# Patient Record
Sex: FEMALE | Race: BLACK OR AFRICAN AMERICAN | NOT HISPANIC OR LATINO | Employment: FULL TIME | URBAN - METROPOLITAN AREA
[De-identification: names, ages, dates, MRNs, and addresses within clinical notes are randomized per-mention and may not be internally consistent; named-entity substitution may affect disease eponyms.]

---

## 2017-09-12 PROBLEM — I10 ESSENTIAL HYPERTENSION, BENIGN: Status: ACTIVE | Noted: 2017-09-12

## 2018-12-16 PROBLEM — J45.21 MILD INTERMITTENT ASTHMA WITH ACUTE EXACERBATION: Status: ACTIVE | Noted: 2018-12-16

## 2018-12-16 PROBLEM — E88.810 INSULIN RESISTANCE SYNDROME: Status: ACTIVE | Noted: 2018-12-16

## 2018-12-16 PROBLEM — J01.00 ACUTE NON-RECURRENT MAXILLARY SINUSITIS: Status: ACTIVE | Noted: 2018-12-16

## 2018-12-16 PROBLEM — I11.9 HYPERTENSIVE LEFT VENTRICULAR HYPERTROPHY, WITHOUT HEART FAILURE: Status: ACTIVE | Noted: 2018-12-16

## 2019-03-18 PROBLEM — J01.00 ACUTE NON-RECURRENT MAXILLARY SINUSITIS: Status: RESOLVED | Noted: 2018-12-16 | Resolved: 2019-03-18

## 2019-07-19 PROBLEM — E78.5 HYPERLIPIDEMIA: Status: ACTIVE | Noted: 2019-07-19

## 2019-07-19 PROBLEM — I12.9 HYPERTENSIVE NEPHROPATHY: Status: ACTIVE | Noted: 2019-07-19

## 2019-12-17 PROBLEM — B07.0 PLANTAR WART OF RIGHT FOOT: Status: ACTIVE | Noted: 2019-12-17

## 2019-12-19 ENCOUNTER — OFFICE VISIT (OUTPATIENT)
Dept: PODIATRY | Facility: CLINIC | Age: 39
End: 2019-12-19
Payer: COMMERCIAL

## 2019-12-19 VITALS
WEIGHT: 220.88 LBS | HEIGHT: 65 IN | BODY MASS INDEX: 36.8 KG/M2 | HEART RATE: 97 BPM | DIASTOLIC BLOOD PRESSURE: 97 MMHG | SYSTOLIC BLOOD PRESSURE: 159 MMHG

## 2019-12-19 DIAGNOSIS — M79.675 PAIN OF TOE OF LEFT FOOT: ICD-10-CM

## 2019-12-19 DIAGNOSIS — B07.0 PLANTAR WARTS: Primary | ICD-10-CM

## 2019-12-19 PROCEDURE — 17000 PR DESTRUCTION(LASER SURGERY,CRYOSURGERY,CHEMOSURGERY),PREMALIGNANT LESIONS,FIRST LESION: ICD-10-PCS | Mod: TA,S$GLB,, | Performed by: PODIATRIST

## 2019-12-19 PROCEDURE — 3008F PR BODY MASS INDEX (BMI) DOCUMENTED: ICD-10-PCS | Mod: CPTII,S$GLB,, | Performed by: PODIATRIST

## 2019-12-19 PROCEDURE — 99204 PR OFFICE/OUTPT VISIT, NEW, LEVL IV, 45-59 MIN: ICD-10-PCS | Mod: 25,S$GLB,, | Performed by: PODIATRIST

## 2019-12-19 PROCEDURE — 3008F BODY MASS INDEX DOCD: CPT | Mod: CPTII,S$GLB,, | Performed by: PODIATRIST

## 2019-12-19 PROCEDURE — 3077F SYST BP >= 140 MM HG: CPT | Mod: CPTII,S$GLB,, | Performed by: PODIATRIST

## 2019-12-19 PROCEDURE — 99204 OFFICE O/P NEW MOD 45 MIN: CPT | Mod: 25,S$GLB,, | Performed by: PODIATRIST

## 2019-12-19 PROCEDURE — 99999 PR PBB SHADOW E&M-EST. PATIENT-LVL III: ICD-10-PCS | Mod: PBBFAC,,, | Performed by: PODIATRIST

## 2019-12-19 PROCEDURE — 17000 DESTRUCT PREMALG LESION: CPT | Mod: TA,S$GLB,, | Performed by: PODIATRIST

## 2019-12-19 PROCEDURE — 3077F PR MOST RECENT SYSTOLIC BLOOD PRESSURE >= 140 MM HG: ICD-10-PCS | Mod: CPTII,S$GLB,, | Performed by: PODIATRIST

## 2019-12-19 PROCEDURE — 3080F PR MOST RECENT DIASTOLIC BLOOD PRESSURE >= 90 MM HG: ICD-10-PCS | Mod: CPTII,S$GLB,, | Performed by: PODIATRIST

## 2019-12-19 PROCEDURE — 99999 PR PBB SHADOW E&M-EST. PATIENT-LVL III: CPT | Mod: PBBFAC,,, | Performed by: PODIATRIST

## 2019-12-19 PROCEDURE — 3080F DIAST BP >= 90 MM HG: CPT | Mod: CPTII,S$GLB,, | Performed by: PODIATRIST

## 2019-12-19 NOTE — PROGRESS NOTES
Subjective:     Patient ID: Pooja Dow is a 39 y.o. female.    Chief Complaint: Plantar Warts (pt c/o pain while walking or standing, rates pain 8/10, nondiabetic pt, wears steel toe boot while working, wears casual shoes, PCP Lizbeth 19)    HPI: This 39 year old female presents today complaining of painful spot on left foot. When asked were to indicate where the pain is, patient points to left plantar hallux. Patient states pain and spot has been present for weeks. Patient states they have tried OTC medications and pedicures. Patient has no other pedal complaints at this time.    Patient Active Problem List   Diagnosis    Annual physical exam    Obesity (BMI 30.0-34.9)    Essential hypertension, benign    Mild intermittent asthma with acute exacerbation    Hypertensive left ventricular hypertrophy, without heart failure    Insulin resistance syndrome    Hyperlipidemia    Hypertensive nephropathy    Plantar wart of right foot       Medication List with Changes/Refills   Current Medications    ALBUTEROL (PROVENTIL/VENTOLIN HFA) 90 MCG/ACTUATION INHALER    Inhale 2 puffs into the lungs every 6 (six) hours as needed for Wheezing. Rescue    BUDESONIDE-FORMOTEROL 160-4.5 MCG (SYMBICORT) 160-4.5 MCG/ACTUATION HFAA    Inhale 2 puffs into the lungs 2 (two) times daily.    BUTALBITAL-ACETAMINOPHEN-CAFF -40 MG -40 MG CAP    Take one cap by mouth every 4 hours as needed    FLUTICASONE PROPIONATE (FLONASE) 50 MCG/ACTUATION NASAL SPRAY    2 sprays (100 mcg total) by Each Nostril route once daily.    LOSARTAN (COZAAR) 100 MG TABLET    Take 1 tablet (100 mg total) by mouth once daily.    OMEPRAZOLE (PRILOSEC) 40 MG CAPSULE    Take 1 capsule (40 mg total) by mouth once daily.    SITAGLIPTIN (JANUVIA) 100 MG TAB    Take 1 tablet (100 mg total) by mouth once daily.       Review of patient's allergies indicates:  No Known Allergies    Past Surgical History:   Procedure Laterality Date      "SECTION, LOW TRANSVERSE  2005/2010    TUBAL LIGATION      tummy tuck         Family History   Problem Relation Age of Onset    Diabetes Mother     Hypertension Mother     Stroke Father         cerebral aneurysm    Diabetes Sister        Social History     Socioeconomic History    Marital status:      Spouse name: Not on file    Number of children: 2    Years of education: Not on file    Highest education level: Not on file   Occupational History     Employer: [employer] ([address])   Social Needs    Financial resource strain: Not on file    Food insecurity:     Worry: Not on file     Inability: Not on file    Transportation needs:     Medical: Not on file     Non-medical: Not on file   Tobacco Use    Smoking status: Never Smoker    Smokeless tobacco: Never Used   Substance and Sexual Activity    Alcohol use: No    Drug use: No    Sexual activity: Yes   Lifestyle    Physical activity:     Days per week: Not on file     Minutes per session: Not on file    Stress: Not on file   Relationships    Social connections:     Talks on phone: Not on file     Gets together: Not on file     Attends Quaker service: Not on file     Active member of club or organization: Not on file     Attends meetings of clubs or organizations: Not on file     Relationship status: Not on file   Other Topics Concern    Not on file   Social History Narrative     at [employer]       Vitals:    12/19/19 1506   BP: (!) 159/97   Pulse: 97   Weight: 100.2 kg (220 lb 14.4 oz)   Height: 5' 5" (1.651 m)   PainSc: 0-No pain     Review of Systems   Constitutional: Negative for chills and fever.   Respiratory: Negative for shortness of breath.    Cardiovascular: Negative for chest pain, palpitations, orthopnea, claudication and leg swelling.   Gastrointestinal: Negative for diarrhea, nausea and vomiting.   Musculoskeletal: Negative for joint pain.   Skin: Negative for rash.   Neurological: Negative for " dizziness, tingling, sensory change, focal weakness and weakness.   Psychiatric/Behavioral: Negative.          Objective:      PHYSICAL EXAM: Apperance: Alert and orient in no distress,well developed, and with good attention to grooming and body habits  Patient presents ambulating in casual shoes.   LOWER EXTREMITIES EXAM:  VASCULAR: Dorsalis pedis pulses 2/4 bilateral and Posterior Tibial pulses 2/4 bilateral. Capillary fill time <3 seconds bilateral. No edema observed bilateral. Varicosities absent bilateral. Skin temperature of the lower extremities is warm to warm, proximal to distal. Hair growth WNL bilateral.  DERMATOLOGICAL: Mild hyperkeratotic lesion with no central skin lines observed to plantar left hallux. Webspaces 1,2,3,4 clean, dry and without evidence of break in skin integrity bilateral.   NEUROLOGICAL: Light touch, sharp-dull, proprioception all present and equal bilaterally.    MUSCULOSKELETAL: Muscle strength is 5/5 for foot inverters, everters, plantarflexors, and dorsiflexors. Muscle tone is normal. Pain on lateral palpation of hyperkeratotic lesion plantar left hallux.         Assessment:       Encounter Diagnoses   Name Primary?    Plantar warts - Left Foot Yes    Pain of toe of left foot          Plan:   Plantar warts - Left Foot    Pain of toe of left foot      I counseled the patient on her conditions, regarding findings of my examination, my impressions, and usual treatment plan.   With patient's permission, hyperkeratotic lesion of the left hallux was debrided with #15 blade until pinpoint bleeding was noted. Applied Cantharidin was applied to base of wart, and covered with offloading pad and tape. Patient instructed to keep dressing intact and dry for 2-4 hours and then remove.   Prescribed topical compound (wart cream) consisting of flurbiprofen, acyclovir, cimetidine, and salicylic acid. Patient instructed to apply to wart twice daily, cover with tape and offloading pad. Discussed  with patient that there may be out of pocket cost of cream pending insurance, and that they may refuse medication if cost is an issue. Patient states they understand.   Patient to return in this office in approximately 3 weeks, or sooner if needed.               Stephanie Tabor DPM  [facility]

## 2019-12-19 NOTE — PATIENT INSTRUCTIONS
Plantar Wart Aftercare: CANTHARIDIN topical    You have just had a topical agent applied to your plantar wart(s) to help kill the virus.  The following instructions will optimize the outcome of your wart removal procedure today. Cantharidin is a blistering solution which causes redness, swelling and fluid accumulation under the lesion. It is a strong agent that will help kill the virus on your foot.      Cantharidin   1. Keep the treated areas covered with a bandage or tape if applied at your visit. This will prevent potentially getting the medication on any unwanted skin area or eyes. Also avoid getting the treated areas moist or wet as this may spread the cantharone to unwanted areas.     2. After having cantharone applied you should wash it off gently with the scrub that was provided to you in:  2 to 4 hours after treatment carefully with soap and water. DO NOT SCRUB the area(s). There should be a film over the treated area(s) after washing.     If severe stinging or burning occurs before the 2 to 4 hours are up, it is okay to wash the area sooner. DO NOT SCRUB the area(s) as there should be a film over the treated area(s).     3. If you do not remove the chemical further blistering will occur. Blistering will start to form in about 3 to 8 hours post treatment for most people. Some people may be very sensitive to the agent and may experience a tingling or burning sensation or discomfort/pain at the treatment site(s). Tylenol or Advil may be taken for discomfort.     4. For treatment of the blister sites, cleanse daily with an antibacterial soap and apply Polysporin or Vaseline ointment and a band-aid.     5.  If a severe reaction does occur (large blistering, intense redness, pus, swelling, or pain), please call the office     6. In 1 to 2 weeks crusting, scabbing and peeling occur. Dr. Tabor will see you back in her office for follow up visit.     Some people might require a second treatment if the virus still  "remains.          Plantar Warts    Contrary to the old folk tale, you can't get warts from touching a toad. Warts are non-cancerous skin growths caused by human papillomavirus (HPV). This virus is passed from person to person where it enters the body through breaks in the skin. The time between contact to developing lesions can be months or longer.   Some people are more likely to get warts than others.  This may be due to the portal of entry of the wart virus, such as in children who bite their nails or pick at hangnails.  Patients with weaker immune systems are also more likely to get warts.    Warts are usually skin-colored and rough to the touch.  There are several kinds of warts such as common, plantar (foot), and flat.  Plantar warts usually occur on the soles of the feet. The HPV virus grows in warm, moist environments, such as those created in a locker room or in your shoes when your feet perspire and the moisture is trapped.  Plantar warts will often spread to other areas of the foot, increase in size, and have "babies," resulting in a cluster that resembles a mosaic. Plantar warts can erupt anywhere on the sole of the foot. They may be difficult to distinguish from calluses. However, you may be able to see tiny black dots on the surface layer of a plantar wart. These are the ends of capillary blood vessels. .  Plantar warts can be very painful and tender. Standing and walking causes the wart to pinch the sensory nerves between the folds of skin which causes pain.    In children, warts can disappear without treatment over months to years.  In adults, this does not happen as easily or quickly.  Painful, rapidly multiplying warts should be treated.      Treatment  There are a variety of treatments for warts.  Most treatments aim to stimulate your bodys immune response to the lesion. The wart can resolve without therapy in many cases.  Salacylic acid in a solution or plaster (available over-the-counter) " applied daily may take weeks to months of treatment.  Cantharone (bettle juice extract) and/or cryotherapy (freezing the wart) are the most common treatments in the medical office setting.  Cantharone is typically more effective and requires fewer applications than cryotherapy. Both of these treatments produce blistering of the skin and help the body eliminate the wart.  Surgical intervention (cutting the wart out), injections, and immunotherapy are other office-based therapies used less commonly for warts.  Duct tape applied to warts continuously (removed as it peels and reapplied after washing the area) for 6-12 weeks can also be effective for treatment.      Prevention  To reduce your risk for getting plantar warts, be sure to wear shower thongs or sandals when you use a public locker room or shower. Use foot powders and change your socks frequently to keep the feet dry.      More information on Plantar Wart:  Plantar Wart (Verruca Plantaris)       What is a Plantar Wart?  A wart is a small growth on the skin that develops when the skin is infected by a virus. Warts can develop anywhere on the foot, but typically they appear on the bottom (plantar side) of the foot. Plantar warts most commonly occur in children, adolescents, and the elderly.  There are two types of plantar warts:  A solitary wart is a single wart. It often increases in size and may eventually multiply, forming additional satellite warts.   Mosaic warts are a cluster of several small warts growing closely together in one area. Mosaic warts are more difficult to treat than solitary warts.   Causes  Plantar warts are caused by direct contact with the human papilloma virus (HPV). This is the same virus that causes warts on other areas of the body.  Symptoms  The symptoms of a plantar wart may include:  Thickened skin. Often a plantar wart resembles a callus because of its tough, thick tissue.   Pain. Walking and standing may be painful. Squeezing  the sides of the wart may also cause pain.   Tiny black dots. These often appear on the surface of the wart. The dots are actually dried blood contained in the capillaries (tiny blood vessels).   Plantar warts grow deep into the skin. Usually this growth occurs slowly, with the wart starting small and becoming larger over time.  Diagnosis and Treatment  To diagnose a plantar wart, the foot and ankle surgeon will examine the patients foot and look for signs and symptoms of a wart.  Although plantar warts may eventually clear up on their own, most patients desire faster relief. The goal of treatment is to completely remove the wart.  The foot and ankle surgeon may use topical or oral treatments, laser therapy, cryotherapy (freezing), acid treatments, or surgery to remove the wart.  Regardless of the treatment approaches undertaken, it is important that the patient follow the surgeons instructions, including all home care and medication that has been prescribed, as well as follow-up visits with the surgeon. Warts may return, requiring further treatment.  If there is no response to treatment, further diagnostic evaluation may be necessary. In such cases, the surgeon can perform a biopsy to rule out other potential causes for the growth.  Although there are many folk remedies for warts, patients should be aware that these remain unproven and may be dangerous. Patients should never try to remove warts themselves. This can do more harm than good.  Copyright © 2011    [employer] (ACFAS), All Rights Reserved.

## 2019-12-19 NOTE — LETTER
December 19, 2019      Coty Nieves MD  [address]  [place] [address]           [facility]  [address] [facility]  [place] [address]  Phone: [phone]  Fax: [phone]          Patient: Pooja Dow   MR Number: [id number]   YOB: 1980   Date of Visit: 12/19/2019       Dear Dr. Coty Nieves:    Thank you for referring Pooja Dow to me for evaluation. Attached you will find relevant portions of my assessment and plan of care.    If you have questions, please do not hesitate to call me. I look forward to following Pooja Dow along with you.    Sincerely,    Stephanie Tabor DPM    Enclosure  CC:  No Recipients    If you would like to receive this communication electronically, please contact [email or web address] or [phone] to request more information on [employer] Link access.    For providers and/or their staff who would like to refer a patient to [facility], please contact us through our one-stop-shop provider referral line, [facility], at [phone].    If you feel you have received this communication in error or would no longer like to receive these types of communications, please e-mail [email or web address]

## 2021-04-28 ENCOUNTER — PATIENT MESSAGE (OUTPATIENT)
Dept: RESEARCH | Facility: HOSPITAL | Age: 41
End: 2021-04-28

## 2022-02-14 ENCOUNTER — HOSPITAL ENCOUNTER (OUTPATIENT)
Dept: RADIOLOGY | Facility: HOSPITAL | Age: 42
Discharge: HOME | End: 2022-02-14
Attending: INTERNAL MEDICINE
Payer: COMMERCIAL

## 2022-02-14 DIAGNOSIS — R10.9 ABDOMINAL PAIN, UNSPECIFIED ABDOMINAL LOCATION: ICD-10-CM

## 2022-02-14 PROCEDURE — A9698 NON-RAD CONTRAST MATERIALNOC: HCPCS | Performed by: INTERNAL MEDICINE

## 2022-02-14 PROCEDURE — 74177 CT ABD & PELVIS W/CONTRAST: CPT | Mod: TC

## 2022-02-14 PROCEDURE — 25500020 PHARM REV CODE 255: Performed by: INTERNAL MEDICINE

## 2022-02-14 RX ADMIN — IOHEXOL 100 ML: 350 INJECTION, SOLUTION INTRAVENOUS at 06:02

## 2022-02-14 RX ADMIN — IOHEXOL 1000 ML: 9 SOLUTION ORAL at 05:02

## 2022-02-15 PROBLEM — R19.00 PELVIC MASS IN FEMALE: Status: ACTIVE | Noted: 2022-02-15

## 2022-02-15 NOTE — PROGRESS NOTES
Ct shows a right adnexal mass-?ovarian cyst-pt needs transvaginal ultrasound today please to rule out malignancy vs a complex cyst  ------------------------------  Please schedule at [facility] or [facility]

## 2022-02-18 ENCOUNTER — HOSPITAL ENCOUNTER (OUTPATIENT)
Dept: RADIOLOGY | Facility: HOSPITAL | Age: 42
Discharge: HOME | End: 2022-02-18
Attending: INTERNAL MEDICINE
Payer: COMMERCIAL

## 2022-02-18 DIAGNOSIS — R19.00 PELVIC MASS IN FEMALE: ICD-10-CM

## 2022-02-18 PROCEDURE — 72197 MRI PELVIS W/O & W/DYE: CPT | Mod: 26,,, | Performed by: RADIOLOGY

## 2022-02-18 PROCEDURE — 25500020 PHARM REV CODE 255: Performed by: INTERNAL MEDICINE

## 2022-02-18 PROCEDURE — 72197 MRI FEMALE PELVIS W W/O CONTRAST: ICD-10-PCS | Mod: 26,,, | Performed by: RADIOLOGY

## 2022-02-18 PROCEDURE — A9585 GADOBUTROL INJECTION: HCPCS | Performed by: INTERNAL MEDICINE

## 2022-02-18 PROCEDURE — 72197 MRI PELVIS W/O & W/DYE: CPT | Mod: TC

## 2022-02-18 RX ORDER — GADOBUTROL 604.72 MG/ML
9.5 INJECTION INTRAVENOUS
Status: COMPLETED | OUTPATIENT
Start: 2022-02-18 | End: 2022-02-18

## 2022-02-18 RX ADMIN — GADOBUTROL 9.5 ML: 604.72 INJECTION INTRAVENOUS at 11:02

## 2022-02-20 NOTE — PROGRESS NOTES
Mri shows possible infection vs malignancy of the ovary  Will order ca-125  Refer back to gyn  Treat if history support pid

## 2023-01-06 PROBLEM — E55.9 VITAMIN D DEFICIENCY: Status: ACTIVE | Noted: 2020-12-29

## 2024-01-02 ENCOUNTER — HOSPITAL ENCOUNTER (OUTPATIENT)
Dept: RADIOLOGY | Facility: HOSPITAL | Age: 44
Discharge: HOME | End: 2024-01-02
Attending: INTERNAL MEDICINE
Payer: COMMERCIAL

## 2024-01-02 DIAGNOSIS — R42 DIZZINESS AND GIDDINESS: ICD-10-CM

## 2024-01-02 PROCEDURE — 70551 MRI BRAIN STEM W/O DYE: CPT | Mod: 26,,, | Performed by: RADIOLOGY

## 2024-01-02 PROCEDURE — 70551 MRI BRAIN STEM W/O DYE: CPT | Mod: TC

## 2025-07-17 PROBLEM — E66.9 OBESITY (BMI 35.0-39.9 WITHOUT COMORBIDITY): Status: ACTIVE | Noted: 2024-06-28

## 2025-07-21 ENCOUNTER — HOSPITAL ENCOUNTER (OUTPATIENT)
Dept: PREADMISSION TESTING | Facility: HOSPITAL | Age: 45
Discharge: HOME | End: 2025-07-21
Attending: INTERNAL MEDICINE
Payer: COMMERCIAL

## 2025-07-21 DIAGNOSIS — Z12.11 COLON CANCER SCREENING: Primary | ICD-10-CM

## 2025-07-21 RX ORDER — SODIUM, POTASSIUM,MAG SULFATES 17.5-3.13G
1 SOLUTION, RECONSTITUTED, ORAL ORAL DAILY
Qty: 1 KIT | Refills: 0 | Status: SHIPPED | OUTPATIENT
Start: 2025-07-21 | End: 2025-07-23

## 2025-08-04 ENCOUNTER — ANESTHESIA EVENT (OUTPATIENT)
Dept: ENDOSCOPY | Facility: HOSPITAL | Age: 45
End: 2025-08-04
Payer: COMMERCIAL

## 2025-08-04 RX ORDER — SODIUM CHLORIDE, SODIUM LACTATE, POTASSIUM CHLORIDE, CALCIUM CHLORIDE 600; 310; 30; 20 MG/100ML; MG/100ML; MG/100ML; MG/100ML
INJECTION, SOLUTION INTRAVENOUS CONTINUOUS
Status: CANCELLED | OUTPATIENT
Start: 2025-08-04

## 2025-08-04 NOTE — ANESTHESIA PREPROCEDURE EVALUATION
2025  Pooja Dow is a 45 y.o., female.    Past Medical History:   Diagnosis Date    Asthma     GERD (gastroesophageal reflux disease)     Hyperlipidemia     Hypertension in pregnancy 2014    Hypertensive left ventricular hypertrophy     Insulin resistance     Migraine      Past Surgical History:   Procedure Laterality Date    ABDOMINOPLASTY       SECTION, LOW TRANSVERSE      DILATION AND CURETTAGE OF UTERUS      ENDOMETRIAL ABLATION      OOPHORECTOMY Right 2022    ROBOTIC HYSTERECTOMY, WITH SALPINGECTOMY Bilateral 2022    TUBAL LIGATION         Pre-op Assessment    I have reviewed the Patient Summary Reports.     I have reviewed the Nursing Notes. I have reviewed the NPO Status.   I have reviewed the Medications.     Review of Systems  Anesthesia Hx:  No problems with previous Anesthesia   History of prior surgery of interest to airway management or planning:  Previous anesthesia: General        Denies Family Hx of Anesthesia complications.    Denies Personal Hx of Anesthesia complications.                    Social:  Non-Smoker, No Alcohol Use       Cardiovascular:     Hypertension           hyperlipidemia                               Pulmonary:    Asthma mild                   Renal/:  Chronic Renal Disease, CKD                Hepatic/GI:  Bowel Prep.   GERD                Neurological:      Headaches                                 Endocrine:        Obesity / BMI > 30      Physical Exam  General: Alert and Oriented    Airway:  Mallampati: II   Mouth Opening: Normal  TM Distance: Normal  Tongue: Normal  Neck ROM: Normal ROM    Dental:  Intact    Chest/Lungs:  Clear to auscultation, Normal Respiratory Rate    Heart:  Rate: Normal  Rhythm: Regular Rhythm        Anesthesia Plan  Type of Anesthesia, risks & benefits discussed:    Anesthesia Type: Gen Natural  Airway  Intra-op Monitoring Plan: Standard ASA Monitors  Post Op Pain Control Plan: multimodal analgesia  Induction:  IV  Informed Consent: Informed consent signed with the Patient and all parties understand the risks and agree with anesthesia plan.  All questions answered.   ASA Score: 2  Day of Surgery Review of History & Physical: H&P Update referred to the surgeon/provider.    Ready For Surgery From Anesthesia Perspective.     .

## 2025-08-06 ENCOUNTER — ANESTHESIA (OUTPATIENT)
Dept: ENDOSCOPY | Facility: HOSPITAL | Age: 45
End: 2025-08-06
Payer: COMMERCIAL

## 2025-08-06 ENCOUNTER — HOSPITAL ENCOUNTER (OUTPATIENT)
Dept: ENDOSCOPY | Facility: HOSPITAL | Age: 45
Discharge: HOME | End: 2025-08-06
Attending: INTERNAL MEDICINE
Payer: COMMERCIAL

## 2025-08-06 VITALS
DIASTOLIC BLOOD PRESSURE: 68 MMHG | WEIGHT: 211 LBS | RESPIRATION RATE: 14 BRPM | TEMPERATURE: 97 F | SYSTOLIC BLOOD PRESSURE: 111 MMHG | HEART RATE: 87 BPM | BODY MASS INDEX: 34.06 KG/M2 | OXYGEN SATURATION: 99 %

## 2025-08-06 DIAGNOSIS — I10 ESSENTIAL HYPERTENSION, BENIGN: Primary | ICD-10-CM

## 2025-08-06 DIAGNOSIS — Z12.11 COLON CANCER SCREENING: ICD-10-CM

## 2025-08-06 LAB — POCT GLUCOSE: 106 MG/DL (ref 70–110)

## 2025-08-06 PROCEDURE — 63600175 PHARM REV CODE 636 W HCPCS: Performed by: NURSE ANESTHETIST, CERTIFIED REGISTERED

## 2025-08-06 PROCEDURE — 37000009 HC ANESTHESIA EA ADD 15 MINS

## 2025-08-06 PROCEDURE — 37000008 HC ANESTHESIA 1ST 15 MINUTES

## 2025-08-06 PROCEDURE — 27201089 HC SNARE, DISP (ANY): Performed by: INTERNAL MEDICINE

## 2025-08-06 PROCEDURE — 25000003 PHARM REV CODE 250: Performed by: INTERNAL MEDICINE

## 2025-08-06 PROCEDURE — 88305 TISSUE EXAM BY PATHOLOGIST: CPT | Mod: TC | Performed by: INTERNAL MEDICINE

## 2025-08-06 RX ORDER — PROPOFOL 10 MG/ML
INJECTION, EMULSION INTRAVENOUS
Status: DISCONTINUED | OUTPATIENT
Start: 2025-08-06 | End: 2025-08-06

## 2025-08-06 RX ORDER — LIDOCAINE HYDROCHLORIDE 20 MG/ML
INJECTION INTRAVENOUS
Status: DISCONTINUED | OUTPATIENT
Start: 2025-08-06 | End: 2025-08-06

## 2025-08-06 RX ORDER — SODIUM CHLORIDE, SODIUM LACTATE, POTASSIUM CHLORIDE, CALCIUM CHLORIDE 600; 310; 30; 20 MG/100ML; MG/100ML; MG/100ML; MG/100ML
INJECTION, SOLUTION INTRAVENOUS CONTINUOUS PRN
Status: DISCONTINUED | OUTPATIENT
Start: 2025-08-06 | End: 2025-08-06

## 2025-08-06 RX ORDER — DEXTROMETHORPHAN/PSEUDOEPHED 2.5-7.5/.8
DROPS ORAL
Status: COMPLETED | OUTPATIENT
Start: 2025-08-06 | End: 2025-08-06

## 2025-08-06 RX ADMIN — PROPOFOL 120 MG: 10 INJECTION, EMULSION INTRAVENOUS at 07:08

## 2025-08-06 RX ADMIN — SODIUM CHLORIDE, POTASSIUM CHLORIDE, SODIUM LACTATE AND CALCIUM CHLORIDE: 600; 310; 30; 20 INJECTION, SOLUTION INTRAVENOUS at 07:08

## 2025-08-06 RX ADMIN — PROPOFOL 20 MG: 10 INJECTION, EMULSION INTRAVENOUS at 07:08

## 2025-08-06 RX ADMIN — LIDOCAINE HYDROCHLORIDE 50 MG: 20 INJECTION INTRAVENOUS at 07:08

## 2025-08-06 RX ADMIN — SIMETHICONE 200 MG: 20 SUSPENSION/ DROPS ORAL at 07:08

## 2025-08-06 NOTE — ANESTHESIA POSTPROCEDURE EVALUATION
Anesthesia Post Evaluation    Patient: Pooja Dow    Procedure(s) Performed: * No procedures listed *    Final Anesthesia Type: general      Patient location during evaluation: PACU  Patient participation: Yes- Able to Participate  Level of consciousness: awake and alert and oriented  Post-procedure vital signs: reviewed and stable  Pain management: adequate  Airway patency: patent    PONV status at discharge: No PONV  Anesthetic complications: no      Cardiovascular status: blood pressure returned to baseline, stable and hemodynamically stable  Respiratory status: unassisted  Hydration status: euvolemic  Follow-up not needed.              Vitals Value Taken Time   /69 08/06/25 08:05     08/06/25 08:06   Pulse 87 08/06/25 08:05   Resp 14 08/06/25 08:05   SpO2 99 % 08/06/25 08:05   Vitals shown include unfiled device data.      No case tracking events are documented in the log.      Pain/Lanny Score: Lanny Score: 10 (8/6/2025  8:05 AM)

## 2025-08-06 NOTE — H&P
PRE PROCEDURE H&P    Patient Name: Pooja Dow  MRN: [id number]  : 1980  Date of Procedure:  2025  Referring Physician: Coty Nieves MD  Primary Physician: Coty Nieves MD  Procedure Physician: Calixto Hernandez MD       Planned Procedure: Colonoscopy  Diagnosis: screening for colon cancer  Chief Complaint: Same as above    HPI: Patient is an 45 y.o. female is here for the above.     Last colonoscopy: Index colonoscopy   Family history: None  Anticoagulation: None    Past Medical History:   Past Medical History:   Diagnosis Date    Asthma     GERD (gastroesophageal reflux disease)     Hyperlipidemia     Hypertension in pregnancy 2014    Hypertensive left ventricular hypertrophy     Insulin resistance     Migraine         Past Surgical History:  Past Surgical History:   Procedure Laterality Date    ABDOMINOPLASTY       SECTION, LOW TRANSVERSE      DILATION AND CURETTAGE OF UTERUS      ENDOMETRIAL ABLATION      OOPHORECTOMY Right 2022    ROBOTIC HYSTERECTOMY, WITH SALPINGECTOMY Bilateral 2022    TUBAL LIGATION          Home Medications:  Prior to Admission medications    Medication Sig Start Date End Date Taking? Authorizing Provider   atorvastatin (LIPITOR) 10 MG tablet Take 1 tablet (10 mg total) by mouth every evening. 25  Yes Coty Nieves MD   butalbital-acetaminophen-caffeine -40 mg (FIORICET, ESGIC) -40 mg per tablet Take 1 tablet by mouth every 4 (four) hours as needed for Headaches. 25  Yes Coty Nieves MD   valsartan-hydrochlorothiazide (DIOVAN-HCT) 160-25 mg per tablet Take 1 tablet by mouth once daily. 25 Yes Coty Nieves MD   albuterol (PROVENTIL/VENTOLIN HFA) 90 mcg/actuation inhaler Inhale 2 puffs into the lungs every 6 (six) hours as needed for Wheezing. Rescue 24  Coty Nieves MD   betamethasone dipropionate (DIPROLENE) 0.05 % ointment Apply topically 2  (two) times daily. 11/11/24   Coty Nieves MD   blood sugar diagnostic Strp To check BG 2 times daily, to use with insurance preferred meter 10/16/23   Coty Nieves MD   blood-glucose meter kit To check BG 2 times daily, to use with insurance preferred meter 10/16/23 7/17/25  Coty Nieves MD   budesonide-formoterol 160-4.5 mcg (SYMBICORT) 160-4.5 mcg/actuation HFAA Inhale 2 puffs into the lungs 2 (two) times daily. 11/11/24   Coty Nieves MD   cholecalciferol, vitamin D3, (VITAMIN D3) 250 mcg (10,000 unit) Cap capsule Take 1 capsule (10,000 Units total) by mouth once a week. 7/17/25   Coty Nieves MD   fluticasone propionate (FLONASE) 50 mcg/actuation nasal spray 2 sprays (100 mcg total) by Each Nostril route once daily. 11/11/24   Coty Nieves MD   lancets Misc To check BG 2 times daily, to use with insurance preferred meter 10/16/23   Coty Nieves MD   LIDOcaine (LIDODERM) 5 % Place 1 patch onto the skin once daily. Remove & Discard patch within 12 hours or as directed by MD 11/11/24   Coty Nieves MD   meloxicam (MOBIC) 15 MG tablet Take 1 tablet (15 mg total) by mouth once daily. 7/17/25   Coty Nieves MD   mupirocin (BACTROBAN) 2 % ointment Apply topically 3 (three) times daily. 11/11/24   Coty Nieves MD   omeprazole (PRILOSEC) 40 MG capsule Take 1 capsule (40 mg total) by mouth once daily. 7/17/25   Coty Nieves MD   tirzepatide 7.5 mg/0.5 mL PnIj Inject 7.5 mg into the skin every 7 days. 7/17/25   Coty Nieves MD   ubrogepant (UBRELVY) 100 mg tablet Take 1 tablet (100 mg total) by mouth as needed for Migraine. Take one tablet(100 mg total) by mouth as needed for migraine every three days, not to exceed 1600 mg every 30 days. 8/1/25 8/31/25  Coty Nieves MD        Allergies:  Review of patient's allergies indicates:  No Known Allergies     Social History:   Social History[1]    Family  History:  Family History   Problem Relation Name Age of Onset    Diabetes Mother      Hypertension Mother      Stroke Father          cerebral aneurysm    Diabetes Sister         ROS: No acute cardiac events, no acute respiratory complaints.     Physical Exam (all patients):    /77 (BP Location: Right arm, Patient Position: Sitting)   Pulse 98   Temp 97.3 °F (36.3 °C) (Temporal)   Resp 16   Wt 95.7 kg (211 lb)   LMP 04/26/2014   SpO2 95%   Breastfeeding No   BMI 34.06 kg/m²   Lungs: Clear to auscultation bilaterally, respirations unlabored  Heart: Regular rate and rhythm, S1 and S2 normal, no obvious murmurs  Abdomen:         Soft, non-tender, bowel sounds normal, no masses, no organomegaly    Lab Results   Component Value Date    WBC 7.4 07/17/2025    MCV 89 07/17/2025    RDW 13.6 07/17/2025     07/17/2025    INR 1.0 06/28/2024    GLU 93 07/17/2025    HGBA1C 6.2 (H) 07/17/2025    BUN 8 07/17/2025     07/17/2025    K 3.7 07/17/2025    CL 96 07/17/2025        SEDATION PLAN: per anesthesia      History reviewed, vital signs satisfactory, cardiopulmonary status satisfactory, sedation options, risks and plans have been discussed with the patient  All their questions were answered and the patient agrees to the sedation procedures as planned and the patient is deemed an appropriate candidate for the sedation as planned.    Procedure explained to patient, informed consent obtained and placed in chart.    Calixto Hernandez  8/6/2025  7:21 AM          [1]   Social History  Socioeconomic History    Marital status:     Number of children: 2   Occupational History     Employer: [employer] ([address])   Tobacco Use    Smoking status: Never    Smokeless tobacco: Never   Substance and Sexual Activity    Alcohol use: No    Drug use: No    Sexual activity: Not Currently   Social History Narrative     at [employer]     Social Drivers of Health     Financial Resource Strain:  Low Risk  (11/29/2023)    Overall Financial Resource Strain (CARDIA)     Difficulty of Paying Living Expenses: Not very hard   Food Insecurity: No Food Insecurity (6/28/2024)    Received from [facility] and Its Subsidiaries and Affiliates    Hunger Vital Sign     Worried About Running Out of Food in the Last Year: Never true     Ran Out of Food in the Last Year: Never true   Transportation Needs: No Transportation Needs (6/28/2024)    [facility]    PRAPARE - Transportation     Lack of Transportation (Medical): No     Lack of Transportation (Non-Medical): No   Physical Activity: Sufficiently Active (11/29/2023)    Exercise Vital Sign     Days of Exercise per Week: 5 days     Minutes of Exercise per Session: 150+ min   Stress: No Stress Concern Present (11/29/2023)    [employer] - Occupational Stress Questionnaire     Feeling of Stress : Not at all   Housing Stability: Low Risk  (11/29/2023)    Housing Stability Vital Sign     Unable to Pay for Housing in the Last Year: No     Number of Places Lived in the Last Year: 1     Unstable Housing in the Last Year: No

## 2025-08-06 NOTE — DISCHARGE INSTRUCTIONS
During your procedure today, you received medications for sedation.  These   medications may affect your judgment, balance and coordination.  Therefore,   for 24 hours, you have the following restrictions:   - DO NOT drive a car, operate machinery, make legal/financial decisions,   sign important papers or drink alcohol.    ACTIVITY:  Today: no heavy lifting, straining or running due to procedural   sedation/anesthesia.  The following day: return to full activity including work.  DIET:  Eat and drink normally unless instructed otherwise.                TREATMENT FOR COMMON SIDE EFFECTS:  - Mild abdominal pain, nausea, belching, bloating or excessive gas:  rest,   eat lightly and use a heating pad.  - Sore Throat: treat with throat lozenges and/or gargle with warm salt   water.  - Because air was used during the procedure, expelling large amounts of air   from your rectum or belching is normal.  - If a bowel prep was taken, you may not have a bowel movement for 1-3 days.    This is normal.  SYMPTOMS TO WATCH FOR AND REPORT TO YOUR PHYSICIAN:  1. Abdominal pain or bloating, other than gas cramps.  2. Chest pain.  3. Back pain.  4. Signs of infection such as: chills or fever occurring within 24 hours   after the procedure.  5. Rectal bleeding, which would show as bright red, maroon, or black stools.   (A tablespoon of blood from the rectum is not serious, especially if   hemorrhoids are present.)  6. Vomiting.  7. Weakness or dizziness.  GO DIRECTLY TO THE NEAREST EMERGENCY ROOM IF YOU HAVE ANY OF THE FOLLOWING:                 Difficulty breathing              Chills and/or fever over 101 F              Persistent vomiting and/or vomiting blood              Severe abdominal pain              Severe chest pain              Black, tarry stools              Bleeding- more than one tablespoon              Any other symptom or condition that you feel may need urgent attention    Your doctor recommends these additional  instructions:  If any biopsies were taken, your doctors clinic will contact you in 1 to 2   weeks with any results.  - Discharge patient to home.   - Resume previous diet.   - Continue present medications.    - Repeat colonoscopy in __ years for surveillance.   - Return to referring physician as previously scheduled.   - Patient has a contact number available for emergencies.  The signs and   symptoms of potential delayed complications were discussed with the   patient.  Return to normal activities tomorrow.  Written discharge   instructions were provided to the patient.  If you have any questions about the above instructions, call the GI   department at [phone] or call the endoscopy unit at [phone]   from 7am until 3 pm.  [facility] [place], EMERGENCY ROOM PHONE NUMBER:   [phone]  IF A COMPLICATION OR EMERGENCY SITUATION ARISES AND YOU ARE UNABLE TO REACH   YOUR PHYSICIAN - GO DIRECTLY TO THE EMERGENCY ROOM.  I have read or have had read to me these discharge instructions for my   procedure and have received a written copy.  I understand these   instructions and will follow-up with my physician if I have any questions.

## 2025-08-06 NOTE — TRANSFER OF CARE
Anesthesia Transfer of Care Note    Patient: Pooja Dow    Procedure(s) Performed: * No procedures listed *    Patient location: PACU    Anesthesia Type: general    Transport from OR: Transported from OR on room air with adequate spontaneous ventilation    Post pain: adequate analgesia    Post assessment: no apparent anesthetic complications and tolerated procedure well    Post vital signs: stable    Level of consciousness: awake    Nausea/Vomiting: no nausea/vomiting    Complications: none    Transfer of care protocol was followed      Last vitals: Visit Vitals  /77 (BP Location: Right arm, Patient Position: Sitting)   Pulse 98   Temp 36.3 °C (97.3 °F) (Temporal)   Resp 16   Wt 95.7 kg (211 lb)   LMP 04/26/2014   SpO2 95%   Breastfeeding No   BMI 34.06 kg/m²

## 2025-08-06 NOTE — PLAN OF CARE
Discharge instructions reviewed with pt, handouts given, verbalized understanding with no further questions at this time. Dr. Hernandez spoke to pt at bedside, reviewed procedure and answered questions aware they are awaiting biopsy results with MD telephone number provided per AVS sheet. VSS on RA, no pain or nausea noted, tolerating po fluids without difficulty, no other complaints noted. Fall precautions reviewed, consents in chart.

## 2025-08-07 LAB
ESTROGEN SERPL-MCNC: NORMAL PG/ML
INSULIN SERPL-ACNC: NORMAL U[IU]/ML
LAB AP CLINICAL INFORMATION: NORMAL
LAB AP GROSS DESCRIPTION: NORMAL
LAB AP PERFORMING LOCATION(S): NORMAL
LAB AP REPORT FOOTNOTES: NORMAL
T3RU NFR SERPL: NORMAL %